# Patient Record
Sex: MALE | Race: ASIAN | NOT HISPANIC OR LATINO
[De-identification: names, ages, dates, MRNs, and addresses within clinical notes are randomized per-mention and may not be internally consistent; named-entity substitution may affect disease eponyms.]

---

## 2024-03-07 PROBLEM — Z00.00 ENCOUNTER FOR PREVENTIVE HEALTH EXAMINATION: Status: ACTIVE | Noted: 2024-03-07

## 2024-03-11 ENCOUNTER — APPOINTMENT (OUTPATIENT)
Dept: ORTHOPEDIC SURGERY | Facility: CLINIC | Age: 49
End: 2024-03-11
Payer: MEDICAID

## 2024-03-11 VITALS — BODY MASS INDEX: 25.06 KG/M2 | WEIGHT: 165.35 LBS | HEIGHT: 68 IN

## 2024-03-11 DIAGNOSIS — Z78.9 OTHER SPECIFIED HEALTH STATUS: ICD-10-CM

## 2024-03-11 DIAGNOSIS — I10 ESSENTIAL (PRIMARY) HYPERTENSION: ICD-10-CM

## 2024-03-11 DIAGNOSIS — Z87.891 PERSONAL HISTORY OF NICOTINE DEPENDENCE: ICD-10-CM

## 2024-03-11 PROCEDURE — 99204 OFFICE O/P NEW MOD 45 MIN: CPT | Mod: 57

## 2024-03-11 RX ORDER — TELMISARTAN AND AMLODIPINE 40; 5 MG/1; MG/1
40-5 TABLET ORAL
Refills: 0 | Status: ACTIVE | COMMUNITY

## 2024-03-12 ENCOUNTER — NON-APPOINTMENT (OUTPATIENT)
Age: 49
End: 2024-03-12

## 2024-03-12 ENCOUNTER — APPOINTMENT (OUTPATIENT)
Dept: PHYSICAL MEDICINE AND REHAB | Facility: CLINIC | Age: 49
End: 2024-03-12
Payer: MEDICAID

## 2024-03-12 VITALS
WEIGHT: 165 LBS | RESPIRATION RATE: 16 BRPM | BODY MASS INDEX: 25.01 KG/M2 | OXYGEN SATURATION: 98 % | SYSTOLIC BLOOD PRESSURE: 120 MMHG | HEART RATE: 84 BPM | HEIGHT: 68 IN | DIASTOLIC BLOOD PRESSURE: 82 MMHG | TEMPERATURE: 98.2 F

## 2024-03-12 DIAGNOSIS — Z01.818 ENCOUNTER FOR OTHER PREPROCEDURAL EXAMINATION: ICD-10-CM

## 2024-03-12 DIAGNOSIS — I10 ESSENTIAL (PRIMARY) HYPERTENSION: ICD-10-CM

## 2024-03-12 PROCEDURE — 36415 COLL VENOUS BLD VENIPUNCTURE: CPT

## 2024-03-12 PROCEDURE — 36410 VNPNXR 3YR/> PHY/QHP DX/THER: CPT

## 2024-03-12 PROCEDURE — 93000 ELECTROCARDIOGRAM COMPLETE: CPT

## 2024-03-12 PROCEDURE — 99204 OFFICE O/P NEW MOD 45 MIN: CPT

## 2024-03-12 NOTE — HISTORY OF PRESENT ILLNESS
[de-identified] : Age: 48 year M PMHx: HTN Hand Dominance: RHD Chief Complaint: Right thumb pain s/p trauma 03/04/24. 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 Patient went to [facility] and was referred to [facility] for further evaluation. Patient reports that he can extend his right thumb but is unable to flex it, prompting him to be referred to [facility] for further evaluation. Denies numbness/tingling.  Trauma: yes Outside Imaging/Treatment: Stitches placed at [facility] 03/04/24 OTC Medications: none OT/PT: none Bracing: wrist brace Pain worse with: extension of right thumb Pain better with: rest

## 2024-03-12 NOTE — ASSESSMENT
[FreeTextEntry1] : EXAM Right hand with ~1cm transverse laceration to dorsum of thumb metacarpal neck/MP joint with inability to extend at IP. EPB/EPL without function. APL appear functional/intact. Able to extend index finger. Sensation intact to pulp with <2sec cap refill.  Right hand radiographs with no fracture nor dislocation.  ASSESSMENT/PLAN Right thumb EPL/EPB laceration - reviewed radiographs and pathoanatomy with patient. In light of laceration to right dorsal hand with thumb IP and MP lag, patient indicated for EPL/EPB repair. Risks, benefits and alternatives discussed with patient including infection, wound dehiscence, tendon rerupture, stiffness, pain, neurovascular injury, DVT and medical complications associated with anesthesia. Discussed the post-op splinting and rehab regimen. Patient understood this discussion, questions were answered and she elected to proceed. While patient has waited to present, we discuss this is an urgent injury and should be cared for with urgency.  Plan for right hand/wrist exploration of penetrating trauma, extensor pollicus longus repair, extensor pollicus brevis repair and associated procedures under local + MAC. SSC  F/u 7 days after surgery

## 2024-03-12 NOTE — PHYSICAL EXAM
[Well Nourished] : well nourished [No Acute Distress] : no acute distress [Well-Appearing] : well-appearing [Well Developed] : well developed [Normal Sclera/Conjunctiva] : normal sclera/conjunctiva [PERRL] : pupils equal round and reactive to light [EOMI] : extraocular movements intact [Normal Outer Ear/Nose] : the outer ears and nose were normal in appearance [Normal Oropharynx] : the oropharynx was normal [No JVD] : no jugular venous distention [No Lymphadenopathy] : no lymphadenopathy [Supple] : supple [Thyroid Normal, No Nodules] : the thyroid was normal and there were no nodules present [No Respiratory Distress] : no respiratory distress  [Clear to Auscultation] : lungs were clear to auscultation bilaterally [No Accessory Muscle Use] : no accessory muscle use [Regular Rhythm] : with a regular rhythm [Normal Rate] : normal rate  [Normal S1, S2] : normal S1 and S2 [No Murmur] : no murmur heard [No Carotid Bruits] : no carotid bruits [No Abdominal Bruit] : a ~M bruit was not heard ~T in the abdomen [No Varicosities] : no varicosities [Pedal Pulses Present] : the pedal pulses are present [No Edema] : there was no peripheral edema [No Palpable Aorta] : no palpable aorta [No Extremity Clubbing/Cyanosis] : no extremity clubbing/cyanosis [Soft] : abdomen soft [Non Tender] : non-tender [Non-distended] : non-distended [No Masses] : no abdominal mass palpated [No HSM] : no HSM [Normal Bowel Sounds] : normal bowel sounds [Normal Posterior Cervical Nodes] : no posterior cervical lymphadenopathy [Normal Anterior Cervical Nodes] : no anterior cervical lymphadenopathy [No CVA Tenderness] : no CVA  tenderness [No Spinal Tenderness] : no spinal tenderness [No Joint Swelling] : no joint swelling [Grossly Normal Strength/Tone] : grossly normal strength/tone [No Rash] : no rash [Coordination Grossly Intact] : coordination grossly intact [Normal Gait] : normal gait [No Focal Deficits] : no focal deficits [Deep Tendon Reflexes (DTR)] : deep tendon reflexes were 2+ and symmetric [Normal Affect] : the affect was normal [Normal Insight/Judgement] : insight and judgment were intact

## 2024-03-13 ENCOUNTER — OUTPATIENT (OUTPATIENT)
Dept: OUTPATIENT SERVICES | Facility: HOSPITAL | Age: 49
LOS: 1 days | End: 2024-03-13
Payer: MEDICAID

## 2024-03-13 ENCOUNTER — TRANSCRIPTION ENCOUNTER (OUTPATIENT)
Age: 49
End: 2024-03-13

## 2024-03-13 VITALS
SYSTOLIC BLOOD PRESSURE: 141 MMHG | OXYGEN SATURATION: 97 % | RESPIRATION RATE: 14 BRPM | HEART RATE: 70 BPM | TEMPERATURE: 98 F | DIASTOLIC BLOOD PRESSURE: 88 MMHG | WEIGHT: 164.91 LBS | HEIGHT: 67 IN

## 2024-03-13 DIAGNOSIS — S61.019A LACERATION WITHOUT FOREIGN BODY OF UNSPECIFIED THUMB WITHOUT DAMAGE TO NAIL, INITIAL ENCOUNTER: ICD-10-CM

## 2024-03-13 DIAGNOSIS — Z01.818 ENCOUNTER FOR OTHER PREPROCEDURAL EXAMINATION: ICD-10-CM

## 2024-03-13 DIAGNOSIS — S61.011A LACERATION WITHOUT FOREIGN BODY OF RIGHT THUMB WITHOUT DAMAGE TO NAIL, INITIAL ENCOUNTER: ICD-10-CM

## 2024-03-13 LAB
ANION GAP SERPL CALC-SCNC: 15 MMOL/L
BASOPHILS # BLD AUTO: 0.09 K/UL
BASOPHILS NFR BLD AUTO: 1 %
BUN SERPL-MCNC: 14 MG/DL
CALCIUM SERPL-MCNC: 9.3 MG/DL
CHLORIDE SERPL-SCNC: 104 MMOL/L
CO2 SERPL-SCNC: 21 MMOL/L
CREAT SERPL-MCNC: 0.82 MG/DL
EGFR: 107 ML/MIN/1.73M2
EOSINOPHIL # BLD AUTO: 0.19 K/UL
EOSINOPHIL NFR BLD AUTO: 2.2 %
GLUCOSE SERPL-MCNC: 125 MG/DL
HCT VFR BLD CALC: 41.1 %
HGB BLD-MCNC: 14 G/DL
IMM GRANULOCYTES NFR BLD AUTO: 0.3 %
LYMPHOCYTES # BLD AUTO: 3.28 K/UL
LYMPHOCYTES NFR BLD AUTO: 37.8 %
MAN DIFF?: NORMAL
MCHC RBC-ENTMCNC: 30.9 PG
MCHC RBC-ENTMCNC: 34.1 GM/DL
MCV RBC AUTO: 90.7 FL
MONOCYTES # BLD AUTO: 0.73 K/UL
MONOCYTES NFR BLD AUTO: 8.4 %
NEUTROPHILS # BLD AUTO: 4.36 K/UL
NEUTROPHILS NFR BLD AUTO: 50.3 %
PLATELET # BLD AUTO: 267 K/UL
POTASSIUM SERPL-SCNC: 3.8 MMOL/L
RBC # BLD: 4.53 M/UL
RBC # FLD: 12.3 %
SODIUM SERPL-SCNC: 140 MMOL/L
WBC # FLD AUTO: 8.68 K/UL

## 2024-03-13 PROCEDURE — G0463: CPT

## 2024-03-13 NOTE — H&P PST ADULT - MUSCULOSKELETAL
details… right thumb/no joint swelling/no joint erythema/no joint warmth/decreased ROM/normal gait/strength 5/5 bilateral upper extremities/strength 5/5 bilateral lower extremities/decreased strength

## 2024-03-13 NOTE — HISTORY OF PRESENT ILLNESS
[FreeTextEntry1] : Medical clearance  [de-identified] : Patient encounter today for medical clearance requested by Dr. Rhodes for right thumb extensor tendon repair scheduled on 3/14/24 at [facility].   States they have been doing well.  Denies any CP, SOB or diff breathing.  No recent fever, chills, cough or cold type symptoms. No recent LOC or head trauma. Denies a history of seizure disorder. Denies lose bridges, caps, or dentures. Denies complications with anesthesia in the past. Patient had a physical within the last year.  Has no other complaints at this time.

## 2024-03-13 NOTE — H&P PST ADULT - HISTORY OF PRESENT ILLNESS
47 yo male presents s/p trauma to the right hand 3/5/24 while scraping a window at home. He suffered a laceration and states that since then has been unable to move the right thumb. He was sutured and referred to orthopedics. Denies pain unless he attempts to move the right thumb then reports "a pulling sensation".

## 2024-03-13 NOTE — H&P PST ADULT - NSANTHOSAYNRD_GEN_A_CORE
Pulmonology neck 16.25 inches/No. DESTIN screening performed.  STOP BANG Legend: 0-2 = LOW Risk; 3-4 = INTERMEDIATE Risk; 5-8 = HIGH Risk

## 2024-03-13 NOTE — H&P PST ADULT - PROBLEM SELECTOR PLAN 1
right thumb exploration of penetrating trauma right extensor tendon, right extensor pollicus brevis tendon repair. medical clearance requested by Dr. Rhodes. surgical wash instructions reviewed and verbalized understanding

## 2024-03-13 NOTE — PLAN
[FreeTextEntry1] : Labs Drawn by Dr. Jose Matta due to poor venous access.  Patient required lab testing due to conditions in their past medical history requiring periodic monitoring.  Labs were sent to [facility].  EKG - NSR - 79, ALFRED - 0.132, No acute T wave changes noted..   Labs reviewed - non fasting glucose = 125 Discussed and reviewed current medications as follows;  Telmisartan- HTN Patient to continue with present medications - all medications reconciled/reviewed during this visit and listed above. Increase fluids  Patient is medically stable at this time and may proceed with proposed procedure.  This clearance has been forwarded to Dr. Rhodes and his office has been contacted providing medical clearance and all associated documents for this patient.  At least  45 minutes was spent with patient face to face examining and reviewing findings/results during this visit. Ample time was provided to answer any questions or address concerns to the patients satisfaction..  I, Luisana Chacko, attest that this documentation has been prepared under the direction and in the presence of Provider Jose Matta DNP The documentation recorded by the scribe, in my presence, accurately reflects the service I personally performed, and the decisions made by me with my edits as appropriate. Jose Matta DNP

## 2024-03-13 NOTE — H&P PST ADULT - NSICDXFAMILYHX_GEN_ALL_CORE_FT
FAMILY HISTORY:  Father  Still living? Yes, Estimated age: 71-80  Family history of hypertension, Age at diagnosis: Age Unknown    Mother  Still living? Yes, Estimated age: 71-80  Family history of hypertension, Age at diagnosis: Age Unknown  Family history of type 2 diabetes mellitus, Age at diagnosis: Age Unknown    Sibling  Still living? Yes, Estimated age: 41-50  Family history of hypertension, Age at diagnosis: Age Unknown

## 2024-03-13 NOTE — H&P PST ADULT - NSICDXPASTMEDICALHX_GEN_ALL_CORE_FT
PAST MEDICAL HISTORY:  COVID-19 vaccine series completed     History of COVID-19     Hypertension     Laceration of right thumb

## 2024-03-13 NOTE — H&P PST ADULT - LAST ECHOCARDIOGRAM
You can access the FollowMyHealth Patient Portal offered by [facility] by registering at the following website: [email or web address]. By joining [facility]’s FollowMyHealth portal, you will also be able to view your health information using other applications (apps) compatible with our system.
none

## 2024-03-14 ENCOUNTER — OUTPATIENT (OUTPATIENT)
Dept: OUTPATIENT SERVICES | Facility: HOSPITAL | Age: 49
LOS: 1 days | End: 2024-03-14
Payer: MEDICAID

## 2024-03-14 ENCOUNTER — APPOINTMENT (OUTPATIENT)
Dept: ORTHOPEDIC SURGERY | Facility: HOSPITAL | Age: 49
End: 2024-03-14
Payer: MEDICAID

## 2024-03-14 ENCOUNTER — TRANSCRIPTION ENCOUNTER (OUTPATIENT)
Age: 49
End: 2024-03-14

## 2024-03-14 VITALS
TEMPERATURE: 98 F | SYSTOLIC BLOOD PRESSURE: 137 MMHG | HEART RATE: 58 BPM | OXYGEN SATURATION: 97 % | DIASTOLIC BLOOD PRESSURE: 90 MMHG | RESPIRATION RATE: 16 BRPM

## 2024-03-14 VITALS
HEART RATE: 59 BPM | DIASTOLIC BLOOD PRESSURE: 79 MMHG | TEMPERATURE: 98 F | OXYGEN SATURATION: 100 % | SYSTOLIC BLOOD PRESSURE: 149 MMHG | RESPIRATION RATE: 19 BRPM | HEIGHT: 67 IN | WEIGHT: 162.92 LBS

## 2024-03-14 DIAGNOSIS — S61.019A LACERATION WITHOUT FOREIGN BODY OF UNSPECIFIED THUMB WITHOUT DAMAGE TO NAIL, INITIAL ENCOUNTER: ICD-10-CM

## 2024-03-14 DIAGNOSIS — Z01.818 ENCOUNTER FOR OTHER PREPROCEDURAL EXAMINATION: ICD-10-CM

## 2024-03-14 PROCEDURE — 25118 EXCISE WRIST TENDON SHEATH: CPT | Mod: 59,RT

## 2024-03-14 PROCEDURE — 26418 REPAIR FINGER TENDON: CPT | Mod: F5

## 2024-03-14 PROCEDURE — 26410 REPAIR HAND TENDON: CPT | Mod: AS,RT

## 2024-03-14 PROCEDURE — 20103 EXPL PENTRG WOUND EXTREMITY: CPT | Mod: RT

## 2024-03-14 PROCEDURE — 26418 REPAIR FINGER TENDON: CPT | Mod: 59,RT

## 2024-03-14 PROCEDURE — 25118 EXCISE WRIST TENDON SHEATH: CPT | Mod: RT

## 2024-03-14 RX ORDER — APREPITANT 80 MG/1
40 CAPSULE ORAL ONCE
Refills: 0 | Status: COMPLETED | OUTPATIENT
Start: 2024-03-14 | End: 2024-03-14

## 2024-03-14 RX ORDER — OXYCODONE HYDROCHLORIDE 5 MG/1
5 TABLET ORAL ONCE
Refills: 0 | Status: DISCONTINUED | OUTPATIENT
Start: 2024-03-14 | End: 2024-03-14

## 2024-03-14 RX ORDER — CEFAZOLIN SODIUM 1 G
2000 VIAL (EA) INJECTION ONCE
Refills: 0 | Status: COMPLETED | OUTPATIENT
Start: 2024-03-14 | End: 2024-03-14

## 2024-03-14 RX ORDER — ONDANSETRON 8 MG/1
4 TABLET, FILM COATED ORAL ONCE
Refills: 0 | Status: DISCONTINUED | OUTPATIENT
Start: 2024-03-14 | End: 2024-03-14

## 2024-03-14 RX ORDER — SODIUM CHLORIDE 9 MG/ML
1000 INJECTION, SOLUTION INTRAVENOUS
Refills: 0 | Status: DISCONTINUED | OUTPATIENT
Start: 2024-03-14 | End: 2024-03-14

## 2024-03-14 RX ORDER — HYDROMORPHONE HYDROCHLORIDE 2 MG/ML
0.5 INJECTION INTRAMUSCULAR; INTRAVENOUS; SUBCUTANEOUS
Refills: 0 | Status: DISCONTINUED | OUTPATIENT
Start: 2024-03-14 | End: 2024-03-14

## 2024-03-14 RX ORDER — HYDROMORPHONE HYDROCHLORIDE 2 MG/ML
1 INJECTION INTRAMUSCULAR; INTRAVENOUS; SUBCUTANEOUS
Refills: 0 | Status: DISCONTINUED | OUTPATIENT
Start: 2024-03-14 | End: 2024-03-14

## 2024-03-14 RX ORDER — OXYCODONE HYDROCHLORIDE 5 MG/1
1 TABLET ORAL
Qty: 20 | Refills: 0
Start: 2024-03-14 | End: 2024-03-20

## 2024-03-14 RX ORDER — TELMISARTAN 20 MG/1
1 TABLET ORAL
Refills: 0 | DISCHARGE

## 2024-03-14 RX ORDER — CHLORHEXIDINE GLUCONATE 213 G/1000ML
1 SOLUTION TOPICAL ONCE
Refills: 0 | Status: COMPLETED | OUTPATIENT
Start: 2024-03-14 | End: 2024-03-14

## 2024-03-14 RX ADMIN — APREPITANT 40 MILLIGRAM(S): 80 CAPSULE ORAL at 08:36

## 2024-03-14 RX ADMIN — SODIUM CHLORIDE 100 MILLILITER(S): 9 INJECTION, SOLUTION INTRAVENOUS at 12:09

## 2024-03-14 NOTE — ASU DISCHARGE PLAN (ADULT/PEDIATRIC) - CARE PROVIDER_API CALL
Carmelo Essex  Orthopaedic Surgery  [address], [address]  [place] [address]  Phone: [phone]  Fax: [phone]  Follow Up Time: 2 weeks

## 2024-03-14 NOTE — ASU PATIENT PROFILE, ADULT - FALL HARM RISK - UNIVERSAL INTERVENTIONS
Bed in lowest position, wheels locked, appropriate side rails in place/Call bell, personal items and telephone in reach/Instruct patient to call for assistance before getting out of bed or chair/Non-slip footwear when patient is out of bed/[place] to call system/Physically safe environment - no spills, clutter or unnecessary equipment/Purposeful Proactive Rounding/Room/bathroom lighting operational, light cord in reach

## 2024-03-14 NOTE — ASU DISCHARGE PLAN (ADULT/PEDIATRIC) - ASU DC SPECIAL INSTRUCTIONSFT
rest  ice  elevate  keep area clean and dry   Posi block for comfort  Ibuprofen 400mg  every  6 hours per pain with food  Tylenol  500mg  2  tablets every 8 as need for pain   narcotic pain medication  for severe pain   NON Weight bearing on operated etremity  Follow up Dr. Rhodes office call office to confirm appt   Follow up Primary Care MD  following discharge

## 2024-03-25 ENCOUNTER — APPOINTMENT (OUTPATIENT)
Dept: ORTHOPEDIC SURGERY | Facility: CLINIC | Age: 49
End: 2024-03-25
Payer: MEDICAID

## 2024-03-25 VITALS — BODY MASS INDEX: 25.01 KG/M2 | HEIGHT: 68 IN | WEIGHT: 165 LBS

## 2024-03-25 PROBLEM — Z86.16 PERSONAL HISTORY OF COVID-19: Chronic | Status: ACTIVE | Noted: 2024-03-13

## 2024-03-25 PROBLEM — I10 ESSENTIAL (PRIMARY) HYPERTENSION: Chronic | Status: ACTIVE | Noted: 2024-03-13

## 2024-03-25 PROBLEM — S61.011A LACERATION WITHOUT FOREIGN BODY OF RIGHT THUMB WITHOUT DAMAGE TO NAIL, INITIAL ENCOUNTER: Chronic | Status: ACTIVE | Noted: 2024-03-13

## 2024-03-25 PROBLEM — Z92.29 PERSONAL HISTORY OF OTHER DRUG THERAPY: Chronic | Status: ACTIVE | Noted: 2024-03-13

## 2024-03-25 PROCEDURE — 99024 POSTOP FOLLOW-UP VISIT: CPT

## 2024-03-25 PROCEDURE — 29125 APPL SHORT ARM SPLINT STATIC: CPT | Mod: 58,RT

## 2024-03-29 NOTE — ASSESSMENT
[FreeTextEntry1] : EXAM Right hand with well healed incision, no erythema nor drainage. Thumb held in full extension by repair - intact. Sensation intact to pulp with <2sec cap refill.  Right hand radiographs with no fracture nor dislocation.  ASSESSMENT/PLAN s/p right thumb exploration of penetrating trauma, right EPL tendon repair [03/14/24] - progressing well postop. Sutures removed, patient tolerated well.  Procedure - right short arm plaster thumb spica splint with thumb in full extension placed - patient tolerated well.  Script for OT for thumb spica brace and for short arc active motion provided.  F/u 4 weeks to reassess

## 2024-03-29 NOTE — HISTORY OF PRESENT ILLNESS
[de-identified] : Age: 48 year M PMHx: HTN Hand Dominance: RHD Chief Complaint: Right thumb pain s/p trauma 03/04/24. 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 Patient went to [facility] and was referred to [facility] for further evaluation. Patient reports that he can extend his right thumb but is unable to flex it, prompting him to be referred to [facility] for further evaluation. Denies numbness/tingling.  Trauma: yes Outside Imaging/Treatment: Stitches placed at [facility] 03/04/24 OTC Medications: none OT/PT: none Bracing: wrist brace Pain worse with: extension of right thumb Pain better with: rest  03/25/24: s/p right thumb exploration of penetrating trauma, right EPLtendon repair [03/14/24]. Patient appears with his wrist wrapped and reports that he has been compliant with keeping it clean. Patient reports some altered sensation on the tip of his right thumb. Denies fevers, chills, SOB. Patient was taking oxycodone after the surgery with relief, stating that he only had to take it 5 or 6 times.

## 2024-04-22 ENCOUNTER — APPOINTMENT (OUTPATIENT)
Dept: ORTHOPEDIC SURGERY | Facility: CLINIC | Age: 49
End: 2024-04-22
Payer: MEDICAID

## 2024-04-22 PROCEDURE — 99024 POSTOP FOLLOW-UP VISIT: CPT

## 2024-04-25 NOTE — ASSESSMENT
[FreeTextEntry1] : EXAM Right hand with well healed incision, no erythema nor drainage. Thumb held in full extension by repair - intact. Sensation intact to pulp with <2sec cap refill.  Right hand radiographs with no fracture nor dislocation.  ASSESSMENT/PLAN s/p right thumb exploration of penetrating trauma, right EPL tendon repair [03/14/24] - progressing well postop. Ease out of brace, NWB through 10 weeks, full arc of motion permitted. Continue OT.  F/u 6 weeks to reassess

## 2024-04-25 NOTE — HISTORY OF PRESENT ILLNESS
[de-identified] : Age: 48 year M PMHx: HTN Hand Dominance: RHD Chief Complaint: Right thumb pain s/p trauma 03/04/24. 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 Patient went to [facility] and was referred to [facility] for further evaluation. Patient reports that he can extend his right thumb but is unable to flex it, prompting him to be referred to [facility] for further evaluation. Denies numbness/tingling.  Trauma: yes Outside Imaging/Treatment: Stitches placed at [facility] 03/04/24 OTC Medications: none OT/PT: none Bracing: wrist brace Pain worse with: extension of right thumb Pain better with: rest  03/25/24: s/p right thumb exploration of penetrating trauma, right EPLtendon repair [03/14/24]. Patient appears with his wrist wrapped and reports that he has been compliant with keeping it clean. Patient reports some altered sensation on the tip of his right thumb. Denies fevers, chills, SOB. Patient was taking oxycodone after the surgery with relief, stating that he only had to take it 5 or 6 times.   04/22/24:  s/p right thumb exploration of penetrating trauma, right EPL tendon repair [03/14/24]. Patient reports that he is doing well, stating he has no pain or discomfort at this time. Patient presents in thumb spica brace and states that he only wears it during activities such as work or driving. Denies numbness/tingling.

## 2024-06-04 ENCOUNTER — APPOINTMENT (OUTPATIENT)
Dept: ORTHOPEDIC SURGERY | Facility: CLINIC | Age: 49
End: 2024-06-04
Payer: MEDICAID

## 2024-06-04 DIAGNOSIS — S61.019A LACERATION W/OUT FOREIGN BODY OF UNSPECIFIED THUMB W/OUT DAMAGE TO NAIL, INITIAL ENCOUNTER: ICD-10-CM

## 2024-06-04 PROCEDURE — 99024 POSTOP FOLLOW-UP VISIT: CPT

## 2024-06-04 NOTE — ASSESSMENT
[FreeTextEntry1] : EXAM Right hand with well healed incision, no erythema nor drainage. Thumb held in full extension by repair - intact. Sensation intact to pulp with <2sec cap refill. Able to oppose to all digits, including base of small finger.  Right hand radiographs with no fracture nor dislocation.  ASSESSMENT/PLAN s/p right thumb exploration of penetrating trauma, right EPL tendon repair [03/14/24] - progressing well postop. Ease out of brace, Continue OT, WBAT.  F/u 12 weeks to reassess

## 2024-06-04 NOTE — HISTORY OF PRESENT ILLNESS
[de-identified] : Age: 48 year M PMHx: HTN Hand Dominance: RHD Chief Complaint: Right thumb pain s/p trauma 03/04/24. 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 Patient went to [facility] and was referred to [facility] for further evaluation. Patient reports that he can extend his right thumb but is unable to flex it, prompting him to be referred to [facility] for further evaluation. Denies numbness/tingling.  Trauma: yes Outside Imaging/Treatment: Stitches placed at [facility] 03/04/24 OTC Medications: none OT/PT: none Bracing: wrist brace Pain worse with: extension of right thumb Pain better with: rest  03/25/24: s/p right thumb exploration of penetrating trauma, right EPLtendon repair [03/14/24]. Patient appears with his wrist wrapped and reports that he has been compliant with keeping it clean. Patient reports some altered sensation on the tip of his right thumb. Denies fevers, chills, SOB. Patient was taking oxycodone after the surgery with relief, stating that he only had to take it 5 or 6 times.   04/22/24:  s/p right thumb exploration of penetrating trauma, right EPL tendon repair [03/14/24]. Patient reports that he is doing well, stating he has no pain or discomfort at this time. Patient presents in thumb spica brace and states that he only wears it during activities such as work or driving. Denies numbness/tingling.    06/04/24: s/p right thumb exploration of penetrating trauma, right EPL tendon repair [03/14/24]. Patient reports doing well, attending OT with significant relief.

## (undated) DEVICE — PREP SCRUB SKIN EXIDINE4% 30OZ

## (undated) DEVICE — DRSG ACE BANDAGE 2"

## (undated) DEVICE — TOURNIQUET CUFF 18" DUAL PORT DUAL BLADDER W PLC (BLACK)

## (undated) DEVICE — SUT MONOSOF 5-0 18" P-13

## (undated) DEVICE — DRAPE 3/4 SHEET 52X76"

## (undated) DEVICE — DRAPE TOWEL BLUE 17" X 24"

## (undated) DEVICE — PACK UPPER EXTREMITY

## (undated) DEVICE — TOURNIQUET ESMARK 4"

## (undated) DEVICE — POSITIONER FOAM HEAD CRADLE (PINK)

## (undated) DEVICE — PREP CHLOROHEXIDINE 4% 118CC KIT

## (undated) DEVICE — GLV 7.5 PROTEXIS (WHITE)

## (undated) DEVICE — VENODYNE/SCD SLEEVE CALF MEDIUM

## (undated) DEVICE — GLV 8 PROTEXIS (BLUE)

## (undated) DEVICE — SLING ARM CHIEFTAIN MESH LARGE

## (undated) DEVICE — WARMING BLANKET LOWER ADULT

## (undated) DEVICE — POSITIONER STRAP ARMBOARD VELCRO TS-30